# Patient Record
Sex: FEMALE | Race: WHITE
[De-identification: names, ages, dates, MRNs, and addresses within clinical notes are randomized per-mention and may not be internally consistent; named-entity substitution may affect disease eponyms.]

---

## 2018-08-29 ENCOUNTER — HOSPITAL ENCOUNTER (EMERGENCY)
Dept: HOSPITAL 25 - UCCORT | Age: 23
Discharge: HOME | End: 2018-08-29
Payer: COMMERCIAL

## 2018-08-29 VITALS — DIASTOLIC BLOOD PRESSURE: 66 MMHG | SYSTOLIC BLOOD PRESSURE: 112 MMHG

## 2018-08-29 DIAGNOSIS — J03.90: Primary | ICD-10-CM

## 2018-08-29 PROCEDURE — 99212 OFFICE O/P EST SF 10 MIN: CPT

## 2018-08-29 PROCEDURE — G0463 HOSPITAL OUTPT CLINIC VISIT: HCPCS

## 2018-08-29 PROCEDURE — 87651 STREP A DNA AMP PROBE: CPT

## 2018-08-29 NOTE — UC
UC General HPI





- HPI Summary


HPI Summary: 





Patient states this began 2 days ago with a fever up to 103 and sore throat.  

Yesterday she experienced some nausea vomiting as well but that is since 

resolved.  She denies any runny nose cough short of breath current nausea 

vomiting and diarrhea as well as dysuria.  She denies any sick contacts.





- History of Current Complaint


Chief Complaint: UCGI


Stated Complaint: FEVER/VOMITING X 3 DAYS


Time Seen by Provider: 08/29/18 11:38


Hx Obtained From: Patient


Hx Last Menstrual Period: 8/27/18; on bcp


Onset/Duration: Gradual Onset


Timing: Constant


Pain Intensity: 6


Aggravating: Nothing


Alleviating: Tylenol seems to help


Associated Signs & Symptoms: Positive: Fever





- Allergy/Home Medications


Allergies/Adverse Reactions: 


 Allergies











Allergy/AdvReac Type Severity Reaction Status Date / Time


 


No Known Allergies Allergy   Verified 08/29/18 11:23











Home Medications: 


 Home Medications





Norethindrone AC-Eth Estradiol [Loestrin 21 1-20 Tablet] 1 tab DAILY 08/29/18 [

History Confirmed 08/29/18]











PMH/Surg Hx/FS Hx/Imm Hx


Endocrine History: Thyroid Disease





- Surgical History


Surgical History: None





- Family History


Known Family History: Positive: Cardiac Disease





- Social History


Occupation: Employed Full-time


Lives: With Family


Alcohol Use: Rare


Substance Use Type: None


Smoking Status (MU): Never Smoked Tobacco





- Immunization History


Most Recent Tetanus Shot: UTD


Vaccination Up to Date: Yes





Review of Systems


Constitutional: Fever


Skin: Negative


Eyes: Negative


ENT: Sore Throat


Respiratory: Negative


Cardiovascular: Negative


Gastrointestinal: Vomiting, Nausea


Genitourinary: Negative


Motor: Negative


Neurovascular: Negative


Musculoskeletal: Negative


Neurological: Negative


Psychological: Negative


Is Patient Immunocompromised?: No


All Other Systems Reviewed And Are Negative: Yes





Physical Exam


Triage Information Reviewed: Yes


Appearance: Well-Appearing


Vital Signs: 


 Initial Vital Signs











Temp  97.8 F   08/29/18 11:24


 


Pulse  96   08/29/18 11:24


 


Resp  16   08/29/18 11:24


 


BP  112/66   08/29/18 11:24


 


Pulse Ox  100   08/29/18 11:24











Vital Signs Reviewed: Yes


Eyes: Positive: Conjunctiva Clear


ENT: Positive: Pharyngeal erythema, TMs normal, Tonsillar exudate, Uvula 

midline.  Negative: Nasal congestion, Nasal drainage, Tonsillar swelling, 

Trismus, Muffled voice, Hoarse voice


Neck: Positive: Supple, Tenderness @ - Peritonsillar nodes, Enlarged Nodes @ - 

Peritonsillar nodes


Respiratory: Positive: Lungs clear, Normal breath sounds


Cardiovascular: Positive: RRR, No Murmur


Abdomen Description: Positive: Nontender, No Organomegaly, Soft.  Negative: CVA 

Tenderness (R), CVA Tenderness (L), Distended, Guarding


Bowel Sounds: Positive: Present


Musculoskeletal: Positive: ROM Intact


Neurological: Positive: Alert


Psychological: Positive: Age Appropriate Behavior


Skin Exam: Normal





Diagnostics





- Laboratory


Diagnostic Studies Completed/Ordered: RAPID STREP=NEG





Course/Dx





- Course


Course Of Treatment: Patient is nontoxic.  Rapid strep screen is negative.  No 

concern for peritonsillar abscess.  Treatment is supportive.





- Differential Dx - Multi-Symptom


Provider Diagnoses: Tonsillitis





Discharge





- Sign-Out/Discharge


Documenting (check all that apply): Patient Departure


All imaging exams completed and their final reports reviewed: No Studies





- Discharge Plan


Condition: Stable


Disposition: HOME


Patient Education Materials:  Tonsillitis (ED)


Additional Instructions: 


FOLLOW UP  [facility] IN 5-7 DAYS OR SOONER IF WORSE.





- Billing Disposition and Condition


Condition: STABLE


Disposition: Home

## 2018-10-24 ENCOUNTER — HOSPITAL ENCOUNTER (EMERGENCY)
Dept: HOSPITAL 25 - UCCORT | Age: 23
Discharge: HOME | End: 2018-10-24
Payer: COMMERCIAL

## 2018-10-24 VITALS — DIASTOLIC BLOOD PRESSURE: 78 MMHG | SYSTOLIC BLOOD PRESSURE: 126 MMHG

## 2018-10-24 DIAGNOSIS — J06.9: Primary | ICD-10-CM

## 2018-10-24 DIAGNOSIS — Z79.2: ICD-10-CM

## 2018-10-24 DIAGNOSIS — Z91.018: ICD-10-CM

## 2018-10-24 DIAGNOSIS — E73.9: ICD-10-CM

## 2018-10-24 PROCEDURE — G0463 HOSPITAL OUTPT CLINIC VISIT: HCPCS

## 2018-10-24 PROCEDURE — 99211 OFF/OP EST MAY X REQ PHY/QHP: CPT

## 2018-10-24 NOTE — UC
UC General HPI





- HPI Summary


HPI Summary: 





pt c/o 3 day hx head congestion, cough and feeling feverish.





- History of Current Complaint


Chief Complaint: UCRespiratory


Stated Complaint: COUGH,FEVER


Time Seen by Provider: 10/24/18 17:10


Hx Obtained From: Patient


Hx Last Menstrual Period: 2.5 weeks


Onset/Duration: Gradual Onset


Timing: Constant


Pain Intensity: 6


Associated Signs & Symptoms: Positive: Cough.  Negative: Chest Pain, SOB, 

Wheezing





- Allergy/Home Medications


Allergies/Adverse Reactions: 


 Allergies











Allergy/AdvReac Type Severity Reaction Status Date / Time


 


lactose Allergy  Diarrhea Verified 10/24/18 17:10


 


juicy juice Allergy  Hives Uncoded 10/24/18 17:10











Home Medications: 


 Home Medications





Abx For Tooth Infection 1 dose PO TID 10/24/18 [History Confirmed 10/24/18]


Tylenol Cough And Sinus 2 tab PO ONCE PRN 10/24/18 [History Confirmed 10/24/18]











PMH/Surg Hx/FS Hx/Imm Hx


Endocrine History: Thyroid Disease





- Surgical History


Surgical History: None





- Family History


Known Family History: Positive: Cardiac Disease





- Social History


Lives: With Family


Alcohol Use: Rare


Substance Use Type: None


Smoking Status (MU): Never Smoked Tobacco





- Immunization History


Most Recent Tetanus Shot: UTD


Vaccination Up to Date: Yes





Review of Systems


Constitutional: Fever


Skin: Negative


Eyes: Negative


ENT: Sinus Congestion


Respiratory: Cough


Cardiovascular: Negative


Gastrointestinal: Negative


Genitourinary: Negative


Motor: Negative


Neurovascular: Negative


Musculoskeletal: Negative


Neurological: Negative


Psychological: Negative


Is Patient Immunocompromised?: No


All Other Systems Reviewed And Are Negative: Yes





Physical Exam


Triage Information Reviewed: Yes


Appearance: Well-Appearing


Vital Signs: 


 Initial Vital Signs











Temp  98.6 F   10/24/18 17:11


 


Pulse  86   10/24/18 17:11


 


Resp  18   10/24/18 17:11


 


BP  126/78   10/24/18 17:11


 


Pulse Ox  100   10/24/18 17:11











Vital Signs Reviewed: Yes


Eyes: Positive: Conjunctiva Clear


ENT: Positive: Pharynx normal, Nasal congestion, TMs normal, Sinus tenderness.  

Negative: Nasal drainage


Neck: Positive: Supple, Nontender, No Lymphadenopathy


Respiratory: Positive: Lungs clear, Normal breath sounds


Cardiovascular: Positive: RRR, No Murmur


Abdomen Description: Positive: Nontender, No Organomegaly, Soft


Bowel Sounds: Positive: Present


Musculoskeletal: Positive: ROM Intact


Neurological: Positive: Alert


Psychological: Positive: Age Appropriate Behavior


Skin Exam: Normal





Course/Dx





- Differential Dx - Multi-Symptom


Provider Diagnoses: URI





Discharge





- Sign-Out/Discharge


Documenting (check all that apply): Patient Departure


All imaging exams completed and their final reports reviewed: No Studies





- Discharge Plan


Condition: Stable


Disposition: HOME


Patient Education Materials:  Upper Respiratory Infection (ED)


Referrals: 


JAME Hart [Medical Doctor] - 


Additional Instructions: 


follow up jame if not better in 7 days





- Billing Disposition and Condition


Condition: STABLE


Disposition: Home

## 2018-11-07 ENCOUNTER — HOSPITAL ENCOUNTER (EMERGENCY)
Dept: HOSPITAL 25 - UCCORT | Age: 23
Discharge: HOME | End: 2018-11-07
Payer: COMMERCIAL

## 2018-11-07 VITALS — DIASTOLIC BLOOD PRESSURE: 81 MMHG | SYSTOLIC BLOOD PRESSURE: 123 MMHG

## 2018-11-07 DIAGNOSIS — Z91.011: ICD-10-CM

## 2018-11-07 DIAGNOSIS — M51.16: Primary | ICD-10-CM

## 2018-11-07 DIAGNOSIS — R55: ICD-10-CM

## 2018-11-07 DIAGNOSIS — Z91.018: ICD-10-CM

## 2018-11-07 PROCEDURE — G0463 HOSPITAL OUTPT CLINIC VISIT: HCPCS

## 2018-11-07 PROCEDURE — 99212 OFFICE O/P EST SF 10 MIN: CPT

## 2018-11-07 PROCEDURE — 93005 ELECTROCARDIOGRAM TRACING: CPT

## 2018-11-07 PROCEDURE — 72131 CT LUMBAR SPINE W/O DYE: CPT

## 2018-11-07 NOTE — UC
Back Pain HPI





- HPI Summary


HPI Summary: 





23-year-old woman comes to clinic today with a chief complaint of low back pain 

and near syncope.  Patient suffered from low back pain for years.  This morning 

when she woke up the low back pain was worse she's ever had and she's having 

tingling and pain radiating down the legs.  She is able to walk no loss of 

control of bowel or bladder.  There is no acute trauma known.  When she was at 

work when she was turning and twisting she felt lightheaded and her arms were 

tingling and her legs would no longer hold her up and she fell to the ground.  

Denies any chest pain or palpitations.  No focal weakness or numbness.  She 

feels that her legs feel weak and they were more weak at that time that she 

almost passed out.





- History of Current Complaint


Chief Complaint: UCBackPain


Stated Complaint: BACK PAIN


Time Seen by Provider: 11/07/18 11:43


Hx Last Menstrual Period: 11/2/18


Pain Intensity: 7





- Allergies/Home Medications


Allergies/Adverse Reactions: 


 Allergies











Allergy/AdvReac Type Severity Reaction Status Date / Time


 


lactose Allergy  Diarrhea Verified 10/24/18 17:10


 


juicy juice Allergy  Hives Uncoded 10/24/18 17:10














PMH/Surg Hx/FS Hx/Imm Hx


Previously Healthy: Yes - CRONIC LOW BACK PAIN





- Surgical History


Surgical History: None





- Family History


Known Family History: Positive: Cardiac Disease, Diabetes





- Social History


Alcohol Use: Rare


Substance Use Type: None


Smoking Status (MU): Never Smoked Tobacco





- Immunization History


Most Recent Tetanus Shot: UTD


Vaccination Up to Date: Yes





Review of Systems


Constitutional: Negative


Skin: Negative


Eyes: Negative


ENT: Negative


Respiratory: Negative


Cardiovascular: Negative


Gastrointestinal: Negative


Genitourinary: Negative


Motor: Other - SEE HPI


Neurovascular: Other - SEE HPI


Musculoskeletal: Other: - SEE HPI


Neurological: Other - SEE HPI


Psychological: Negative


Is Patient Immunocompromised?: No


All Other Systems Reviewed And Are Negative: Yes





Physical Exam


Triage Information Reviewed: Yes


Appearance: Well-Appearing, Well-Nourished, Pain Distress - MILD PAIN DISTRESS


Vital Signs: 


 Initial Vital Signs











Temp  96.9 F   11/07/18 11:44


 


Pulse  100   11/07/18 11:44


 


Resp  20   11/07/18 11:44


 


BP  123/81   11/07/18 11:44


 


Pulse Ox  100   11/07/18 11:44











Vital Signs Reviewed: Yes


Eye Exam: Normal


Eyes: Positive: Conjunctiva Clear


Neck exam: Normal


Neck: Positive: Supple


Respiratory: Positive: Lungs clear, Normal breath sounds, No respiratory 

distress


Cardiovascular: Positive: RRR


Musculoskeletal: Positive: Strength Intact, ROM Intact


Neurological: Positive: Muscle Tone Normal, Other: - Patellar reflexes are 2+ 

bilaterally.  Patient reports that she can feel me touching her legs 

bilaterally.  There is no facial droop there is no drift of the arms.  Strength 

is 5 out of 5 with foot ankle plantar flexion and dorsiflexion.  Muscle 

strength is normal with knee extension and flexion and hip flexion bilaterally.

  The hip flexion does make the low back pain worse.


Psychological Exam: Normal


Psychological: Positive: Normal Response To Family, Age Appropriate Behavior


Skin Exam: Normal





Diagnostics





- EKG


Cardiac Rate: NL - AT 12:28


Cardiac Rhythm: Sinus: Normal - 74BPM


Ectopy: None


ST Segment: Normal





Back Pain Course/Dx





- Course


Course Of Treatment: Order Information: CT SPINE LUMBAR W/O.  Accession Number: 

[id number].  CPT: 09585.  INDICATION: Low back pain with radiation down the 

posterior legs.  COMPARISON: There are no prior studies available for 

comparison.  TECHNIQUE: Contiguous axial sections were obtained beginning lower 

thoracic vertebra and.  continuing through the sacrum. Images were 

reconstructed in the sagittal and coronal.  planes.  FINDINGS:  There is a mild 

degree of straightening of the normal lumbar lordosis on the sagittal.  plane 

images. The vertebra and facet joints are otherwise appropriately aligned. No.  

fracture is seen. There is no hyperdense material in the thecal sac to indicate 

acute.  hemorrhage.  There is a mild degree of loss of intervertebral disc 

height at multiple lower thoracic.  and lumbar levels. There is a focus of 

vacuum disc phenomenon at L2/L3.  At L2/L3 there is a mild degree of broad-

based disc protrusion that does not yield any.  significant central canal or 

neural foraminal stenosis.  At L3/L4 there is broad-based disc protrusion 

combining with facet arthropathy and.  thickening of ligamentum flavum to cause 

a very mild degree of central canal stenosis.  At L4/L5 there is broad-based 

disc protrusion combining with facet arthropathy and.  thickening of the 

ligamentum flavum to cause a moderate degree of central canal stenosis.  and 

mild bilateral neural foraminal stenosis.  The visualized soft tissues do not 

exhibit any acute abnormalities.  IMPRESSION: Degenerative disc disease of the 

lower lumbar spine causing. Degrees of.  central canal or neural foraminal 

stenoses most severe at L4/L5. If clinically warranted.  superior 

characterization can be made with a nonemergent MRI of the lumbar spine.  ______

______________________________________________________.  <Electronically signed 

by Luis Mercado MD in [facility]> 11/07/18 [id number].  I discussed the CT results with the 

patient and also EKG.  I believe the near syncopal episode was secondary to 

increase in low back pain when she was turning and twisting at work.  She had 

no chest pain or palpitations.  She does have some disc protrusions and central 

canal and facet stenosis and her lower back on CT.  On examination today she 

does not have any neurologic deficits.  No loss of control of bowel or bladder.

  The plan now is anti-inflammatories ibuprofen and muscle relaxers Flexeril as 

needed and also hydrocodone as needed.  Follow up with her primary care doctor 

is scheduled for November 20, 2018.  I let her know to keep this appointment 

and discuss further treatment with her primary care doctor.  I let her know 

also if she started with a neurologic deficits of weakness or numbness or 

difficulty controlling bowel or bladder she needed to get evaluated in the 

emergency department right away.





- Differential Dx/Diagnosis


Provider Diagnoses: LOW BACK PAIN.  LUMBAR RADICULOPATHY.  NEAR SYNCOPE.  

DEGENERATIVE DISC DISEASE





Discharge





- Sign-Out/Discharge


Documenting (check all that apply): Patient Departure


All imaging exams completed and their final reports reviewed: Yes





- Discharge Plan


Condition: Stable


Disposition: HOME


Prescriptions: 


Cyclobenzaprine TAB* [Flexeril 10 MG TAB*] 10 mg PO TID PRN #15 tab MDD 3


 PRN Reason: Pain


HYDROcodone/ACETAMIN 5-325 MG* [Norco 5-325 TAB*] 1 tab PO Q4H PRN #20 tab MDD 6


 PRN Reason: Pain


Ibuprofen TAB* [Motrin TAB* 600 MG] 600 mg PO Q6H PRN #30 tab


 PRN Reason: Pain


Patient Education Materials:  Acute Low Back Pain (ED), Chronic Back Pain (DC), 

Lower Back Exercises (ED), Lumbar Radiculopathy (ED), Degenerative Disc Disease 

(ED), Near Syncope (ED)


Forms:  *Work Release


Referrals: 


[facility] PHYSICIAN REFERRAL [Outside]


Additional Instructions: 


FOLLOW UP WITH YOUR DOCTOR AS SCHEDULED ON 11/7/18.


GO TO THE EMERGENCY DEPARTMENT FOR ANY WORSENING OF YOUR CONDITION; WEAKNESS, 

NUMBNESS, DIFFICULTY CONTROLLING BOWEL OR BLADDER OR QUESTIONS OR CONCERNS.





- Billing Disposition and Condition


Condition: STABLE


Disposition: Home

## 2019-01-01 ENCOUNTER — HOSPITAL ENCOUNTER (EMERGENCY)
Dept: HOSPITAL 25 - UCCORT | Age: 24
Discharge: HOME | End: 2019-01-01
Payer: COMMERCIAL

## 2019-01-01 VITALS — DIASTOLIC BLOOD PRESSURE: 82 MMHG | SYSTOLIC BLOOD PRESSURE: 118 MMHG

## 2019-01-01 DIAGNOSIS — R05: ICD-10-CM

## 2019-01-01 DIAGNOSIS — H10.9: ICD-10-CM

## 2019-01-01 DIAGNOSIS — J06.9: Primary | ICD-10-CM

## 2019-01-01 PROCEDURE — G0463 HOSPITAL OUTPT CLINIC VISIT: HCPCS

## 2019-01-01 PROCEDURE — 99212 OFFICE O/P EST SF 10 MIN: CPT

## 2019-01-01 NOTE — UC
Throat Pain/Nasal [place] HPI





- HPI Summary


HPI Summary: 


23-year-old female presents with 3 day history of nasal congestion, clear nasal 

drainage, sore throat, and a nonproductive cough.  States last night she 

started to develop some right eye redness and this morning woke up with the eye 

crusted shut and has had continued purulent drainage.  States her child is also 

been sick with URI symptoms as well as bilateral eye redness and discharge.  

Denies fever, chills, ear pain, dysphagia, chest pain, shortness of breath, 

abdominal pain, nausea, vomiting, or diarrhea.








- History of Current Complaint


Chief Complaint: UCRespiratory


Stated Complaint: COUGH, EYE COMPLAINT


Time Seen by Provider: 01/01/19 11:44


Hx Obtained From: Patient


Hx Last Menstrual Period: 2.5 WEEKS AGO


Pain Intensity: 5





- Allergies/Home Medications


Allergies/Adverse Reactions: 


 Allergies











Allergy/AdvReac Type Severity Reaction Status Date / Time


 


lactose Allergy  Diarrhea Verified 01/01/19 11:32


 


juicy juice Allergy  Hives Uncoded 01/01/19 11:32














PMH/Surg Hx/FS Hx/Imm Hx


Previously Healthy: Yes - Denies significant PMH





- Surgical History


Surgical History: None





- Family History


Known Family History: Positive: Cardiac Disease, Diabetes





- Social History


Occupation: Employed Full-time


Lives: With Family


Alcohol Use: Rare


Substance Use Type: None


Smoking Status (MU): Never Smoked Tobacco





- Immunization History


Most Recent Tetanus Shot: UTD


Vaccination Up to Date: Yes





Review of Systems


All Other Systems Reviewed And Are Negative: Yes


Constitutional: Negative: Fever, Chills


Skin: Negative: Rash


Eyes: Positive: Drainage, Eye Redness, Photophobia.  Negative: Blurred Vision, 

Diplopia


ENT: Positive: Sore Throat, Nasal Discharge, Sinus Congestion.  Negative: Ear 

Ache, Sinus Pain/Tenderness


Respiratory: Positive: Cough.  Negative: Shortness Of Breath


Cardiovascular: Negative: Palpitations, Chest Pain


Gastrointestinal: Negative: Abdominal Pain, Vomiting, Diarrhea, Nausea


Genitourinary: Positive: Negative


Neurological: Positive: Negative


Is Patient Immunocompromised?: No





Physical Exam





- Summary


Physical Exam Summary: 


GENERAL APPEARANCE: Well developed, well nourished, alert and cooperative, and 

appears to be in no acute distress.





EYES: Right eye with conjunctival erythema and scant amount of purulent 

drainage. Left eye normal. Vision is grossly intact.





EARS: External auditory canals and tympanic membranes clear, hearing grossly 

intact.





NOSE: Mild nasal congestion without nasal discharge.





THROAT: Mild pharyngeal erythema without tonsilar inflammation, swelling, 

exudate, or lesions.





NECK: Neck supple, non-tender without lymphadenopathy.





CARDIAC: Normal S1 and S2. No S3, S4 or murmurs. Rhythm is regular. There is no 

peripheral edema, cyanosis or pallor. Extremities are warm and well perfused. 

Capillary refill is less than 2 seconds.





LUNGS: Clear to auscultation and percussion without rales, rhonchi, wheezing or 

diminished breath sounds.





ABDOMEN: Positive bowel sounds. Soft, nondistended, nontender. No guarding or 

rebound. No masses or hepatosplenomegally.





MUSKULOSKELETAL: ROM intact to all extremities. No joint erythema or 

tenderness. Normal muscular development. Normal gait.





SKIN: Skin normal color, texture and turgor with no lesions or eruptions.





Triage Information Reviewed: Yes


Vital Signs: 


 Initial Vital Signs











Temp  98.1 F   01/01/19 11:33


 


Pulse  102   01/01/19 11:33


 


Resp  18   01/01/19 11:33


 


BP  118/82   01/01/19 11:33


 


Pulse Ox  99   01/01/19 11:33











Vital Signs Reviewed: Yes





Throat Pain/Nasal Course/Dx





- Course


Course Of Treatment: 23-year-old female presents with 3 day history of nasal 

congestion, clear nasal drainage, sore throat, and a nonproductive cough.  

States last night she started to develop some right eye redness and this 

morning woke up with the eye crusted shut and has had continued purulent 

drainage.  States her child is also been sick with URI symptoms as well as 

bilateral eye redness and discharge.  Denies fever, chills, ear pain, dysphagia

, chest pain, shortness of breath, abdominal pain, nausea, vomiting, or 

diarrhea.  Afebrile.  Vital signs stable.  Exam reveals some right eye erythema 

with scant amount of purulent discharge, mild nasal congestion, pharyngeal 

erythema without tonsillar swelling or exudate, no cervical lymphadenopathy, 

bilateral breath sounds clear, and remainder of exam was unremarkable.  Suspect 

viral upper respiratory infection with right bacterial conjunctivitis.  Will 

treat conjunctivitis with Polytrim 1 drop every 3 hours while awake 7 days and 

recommend symptomatic treatment of the URI.  Patient is to follow-up with her 

primary care provider in 7 days if symptoms do not improve.  Warning symptoms 

were reviewed with the patient.  She verbalizes understanding and agrees with 

plan of care.





- Differential Dx/Diagnosis


Differential Diagnosis/HQI/PQRI: Influenza, Otitis Media, Pharyngitis, 

Tonsillitis, URI, Other - Conjunctivitis


Provider Diagnosis: 


 Viral URI with cough, Conjunctivitis








Discharge





- Sign-Out/Discharge


Documenting (check all that apply): Patient Departure


All imaging exams completed and their final reports reviewed: No Studies





- Discharge Plan


Condition: Stable


Disposition: HOME


Prescriptions: 


Polymyx/Trimethoprim OPTH* [Polytrim OPHTH*] 1 drop RIGHT EYE Q3H 7 Days #1 btl


Patient Education Materials:  Upper Respiratory Infection (ED), Conjunctivitis (

ED)


Referrals: 


No Primary Care Phys,NOPCP [Primary Care Provider] - 


Additional Instructions: 


our history and exam are consistent with a viral upper respiratory infection. 

Viral infections do not respond to antibiotics and are limited to the treatment 

of symptoms. Viral infections typically run their course in 7-10 days.





Drink plenty of fluids to avoid dehydration especially if you are running any 

fever.





Use a saline rinse kit such as Neti Pot or NeilMed at least twice a day to help 

thin secretions and promote drainage of the sinuses.





Use fluticasone (Flonase) nasal spray 2 sprays each nostril once daily.





Take over the counter acetaminophen (Tylenol) or ibuprofen (Advil, Motrin) 

according to directions as needed for pain or fever.





Use salt water gargles several times a day if you have a sore throat.





You may also use Chloraseptic spray or Cepacol lonzenges according to 

directions which contain a numbing medication and can provide some temporary 

relief from your sore throat.





For the conjunctivitis (pink eye) of the right eye we will treat with Polytrim 

eye drops 1 drop into the affected eye every 3 hours while awake for maximum of 

6 doses x 7 days.





Do not touch eye and use good hand hygiene. Do not share towels or wash cloths 

with other to prevent spreading the infection. Use towels and wash cloths only 

once and launder. Change your pillow case each morning until you have completed 

the antibiotics. Throw away any eye makeup you have been using, replace with 

new makeup but do not use until you have completed treatment.





Follow up with your primary care provider in 7 days if symptoms persist.





Seek immediate medical attention in the emergency room if you have fever 

greater than 100.5 F despite taking acetaminophen or ibuprofen, have chest pain

, difficulty breathing, are unable to swallow, or have any worsening of 

symptoms.





- Billing Disposition and Condition


Condition: STABLE


Disposition: Home

## 2019-07-29 ENCOUNTER — HOSPITAL ENCOUNTER (EMERGENCY)
Dept: HOSPITAL 25 - UCCORT | Age: 24
Discharge: HOME HEALTH | End: 2019-07-29
Payer: COMMERCIAL

## 2019-07-29 VITALS — SYSTOLIC BLOOD PRESSURE: 110 MMHG | DIASTOLIC BLOOD PRESSURE: 64 MMHG

## 2019-07-29 DIAGNOSIS — Z3A.00: ICD-10-CM

## 2019-07-29 DIAGNOSIS — O26.899: Primary | ICD-10-CM

## 2019-07-29 DIAGNOSIS — R10.31: ICD-10-CM

## 2019-07-29 PROCEDURE — G0463 HOSPITAL OUTPT CLINIC VISIT: HCPCS

## 2019-07-29 PROCEDURE — 99212 OFFICE O/P EST SF 10 MIN: CPT

## 2019-07-29 NOTE — ED
Abdominal Pain/Female





- HPI Summary


HPI Summary: 





23 yr old female with intermittent right lower quadrant pain that comes and 

goes for the past week. Presently gone.  She has not had an OB appointment yet. 

She states she feels she is 2 months pregnant.  She has had no labs or US to 

date.  No other complaints.   





- History of Current Complaint


Chief Complaint: UCGeneralIllness


Stated Complaint: 2 MONTHS PREGNANT/PAIN RIGHT SIDE/NAUSEA


Time Seen by Provider: 07/29/19 15:21


Hx Last Menstrual Period: 2.5 WEEKS AGO


Pain Intensity: 6


Allergies/Adverse Reactions: 


 Allergies











Allergy/AdvReac Type Severity Reaction Status Date / Time


 


lactose Allergy  Diarrhea Verified 07/29/19 15:11


 


juicy juice Allergy  Hives Uncoded 07/29/19 15:11











Home Medications: 


 Home Medications





Acetaminophen [Tylenol Extra Strength] 1,000 mg PO Q6H PRN 07/29/19 [History 

Confirmed 07/29/19]


Cyclobenzaprine (NF) [Cyclobenzaprine 5 MG (NF)] 5 mg PO TID PRN 07/29/19 [

History Confirmed 07/29/19]











PMH/Surg Hx/FS Hx/Imm Hx


Respiratory History: Reports: Hx Asthma


Infectious Disease History: No


Infectious Disease History: 


   Denies: Traveled Outside the [place] in Last 30 Days





- Family History


Known Family History: Positive: Cardiac Disease, Diabetes





- Social History


Alcohol Use: Occasionally


Substance Use Type: Reports: None


Smoking Status (MU): Never Smoked Tobacco





Review of Systems


Constitutional: Negative


Positive: Abdominal Pain.  Negative: Vomiting, Diarrhea, Nausea


Negative: dysuria, frequency, flank pain, urgency


All Other Systems Reviewed And Are Negative: Yes





Physical Exam


Triage Information Reviewed: Yes


Vital Signs On Initial Exam: 


 Initial Vitals











Temp Pulse Resp BP Pulse Ox


 


 98.5 F   94   24   110/64   99 


 


 07/29/19 15:14  07/29/19 15:14  07/29/19 15:14  07/29/19 15:14  07/29/19 15:14











Vital Signs Reviewed: Yes


Appearance: Positive: Well-Appearing, No Pain Distress


Skin: Positive: Warm, Skin Color Reflects Adequate Perfusion


Head/Face: Positive: Normal Head/Face Inspection


Eyes: Positive: EOMI


ENT: Positive: Normal ENT inspection


Neck: Positive: Nontender


Respiratory/Lung Sounds: Positive: Clear to Auscultation, Breath Sounds Present


Cardiovascular: Positive: RRR.  Negative: Murmur


Abdomen Description: Positive: Nontender.  Negative: CVA Tenderness (R), CVA 

Tenderness (L)


Musculoskeletal: Positive: Strength/ROM Intact


Neurological: Positive: Sensory/Motor Intact, Alert, Oriented to Person Place, 

Time, CN Intact II-III


Psychiatric: Positive: Normal





Diagnostics





- Vital Signs


 Vital Signs











  Temp Pulse Resp BP Pulse Ox


 


 07/29/19 15:14  98.5 F  94  24  110/64  99














- Laboratory


Lab Statement: Any lab studies that have been ordered have been reviewed, and 

results considered in the medical decision making process.





Abdominal Pain Fem Course/Dx





- Course


Course Of Treatment: 23 yr old pregnant female presently pain free with 

intermittent pain.  She will go to the ER for further eval.  She verbalized she 

does not want an ambulance and she will go after leaving here.





- Diagnoses


Provider Diagnoses: 


 Right lower quadrant abdominal pain








Discharge





- Sign-Out/Discharge


Documenting (check all that apply): Patient Departure


All imaging exams completed and their final reports reviewed: No Studies





- Discharge Plan


Condition: Good


Disposition: HOME-RECOMMEND TO ED


Patient Education Materials:  Acute Abdominal Pain (ED)


Referrals: 


No Primary Care Phys,NOPCP [Primary Care Provider] - 


Additional Instructions: 


You need to go to the ER immediately after leaving here for further evaluation 

of your abdominal pain. 





- Billing Disposition and Condition


Condition: GOOD


Disposition: Home-Recommend to ED